# Patient Record
Sex: FEMALE | Race: WHITE | HISPANIC OR LATINO | URBAN - METROPOLITAN AREA
[De-identification: names, ages, dates, MRNs, and addresses within clinical notes are randomized per-mention and may not be internally consistent; named-entity substitution may affect disease eponyms.]

---

## 2017-06-13 ENCOUNTER — APPOINTMENT (RX ONLY)
Dept: URBAN - METROPOLITAN AREA CLINIC 288 | Facility: CLINIC | Age: 27
Setting detail: DERMATOLOGY
End: 2017-06-13

## 2017-06-13 VITALS — HEIGHT: 63 IN | WEIGHT: 125 LBS

## 2017-06-13 DIAGNOSIS — B86 SCABIES: ICD-10-CM

## 2017-06-13 PROBLEM — L30.9 DERMATITIS, UNSPECIFIED: Status: ACTIVE | Noted: 2017-06-13

## 2017-06-13 PROBLEM — I10 ESSENTIAL (PRIMARY) HYPERTENSION: Status: ACTIVE | Noted: 2017-06-13

## 2017-06-13 PROCEDURE — 87220 TISSUE EXAM FOR FUNGI: CPT

## 2017-06-13 PROCEDURE — 99202 OFFICE O/P NEW SF 15 MIN: CPT

## 2017-06-13 PROCEDURE — ? KOH PREP

## 2017-06-13 PROCEDURE — ? DIAGNOSIS COMMENT

## 2017-06-13 PROCEDURE — ? PRESCRIPTION

## 2017-06-13 PROCEDURE — ? COUNSELING

## 2017-06-13 RX ORDER — PERMETHRIN 50 MG/G
CREAM TOPICAL QD
Qty: 2 | Refills: 1 | Status: ERX | COMMUNITY
Start: 2017-06-13

## 2017-06-13 RX ORDER — HYDROXYZINE HYDROCHLORIDE 25 MG/1
TABLET, FILM COATED ORAL
Qty: 60 | Refills: 1 | Status: ERX | COMMUNITY
Start: 2017-06-13

## 2017-06-13 RX ADMIN — HYDROXYZINE HYDROCHLORIDE: 25 TABLET, FILM COATED ORAL at 20:21

## 2017-06-13 RX ADMIN — PERMETHRIN: 50 CREAM TOPICAL at 20:19

## 2017-06-13 ASSESSMENT — LOCATION SIMPLE DESCRIPTION DERM
LOCATION SIMPLE: LEFT HAND
LOCATION SIMPLE: RIGHT POSTERIOR UPPER ARM
LOCATION SIMPLE: LEFT FOREARM

## 2017-06-13 ASSESSMENT — LOCATION ZONE DERM
LOCATION ZONE: HAND
LOCATION ZONE: ARM

## 2017-06-13 ASSESSMENT — LOCATION DETAILED DESCRIPTION DERM
LOCATION DETAILED: LEFT VENTRAL DISTAL FOREARM
LOCATION DETAILED: LEFT DORSAL MIDDLE METACARPOPHALANGEAL JOINT
LOCATION DETAILED: RIGHT PROXIMAL POSTERIOR UPPER ARM

## 2017-06-13 NOTE — PROCEDURE: KOH PREP
Koh Intro Text (From The.....): A KOH prep was ordered and evaluated from the
Add  To Bill: No
Showing: negative findings within normal realm
Detail Level: Detailed

## 2017-06-13 NOTE — PROCEDURE: DIAGNOSIS COMMENT
Comment: DDx as above; suspect bedbugs as patient's boyfriend also with similar eruption and they were noted in bed at hotel in [place] Comment: DDx as above; suspect bedbugs as patient's boyfriend also with similar eruption and they were noted in bed at hotel in [place]

## 2020-10-29 NOTE — HPI: RASH
How Severe Is Your Rash?: moderate
Is This A New Presentation, Or A Follow-Up?: Rash
anal tumor/ mass excision